# Patient Record
Sex: MALE | URBAN - METROPOLITAN AREA
[De-identification: names, ages, dates, MRNs, and addresses within clinical notes are randomized per-mention and may not be internally consistent; named-entity substitution may affect disease eponyms.]

---

## 2022-03-15 ENCOUNTER — APPOINTMENT (RX ONLY)
Dept: URBAN - METROPOLITAN AREA CLINIC 4 | Facility: CLINIC | Age: 32
Setting detail: DERMATOLOGY
End: 2022-03-15

## 2022-03-15 DIAGNOSIS — D22 MELANOCYTIC NEVI: ICD-10-CM

## 2022-03-15 DIAGNOSIS — L81.4 OTHER MELANIN HYPERPIGMENTATION: ICD-10-CM

## 2022-03-15 DIAGNOSIS — L82.1 OTHER SEBORRHEIC KERATOSIS: ICD-10-CM

## 2022-03-15 DIAGNOSIS — L57.8 OTHER SKIN CHANGES DUE TO CHRONIC EXPOSURE TO NONIONIZING RADIATION: ICD-10-CM

## 2022-03-15 DIAGNOSIS — D18.0 HEMANGIOMA: ICD-10-CM

## 2022-03-15 PROBLEM — D18.01 HEMANGIOMA OF SKIN AND SUBCUTANEOUS TISSUE: Status: ACTIVE | Noted: 2022-03-15

## 2022-03-15 PROBLEM — D22.5 MELANOCYTIC NEVI OF TRUNK: Status: ACTIVE | Noted: 2022-03-15

## 2022-03-15 PROCEDURE — ? COUNSELING

## 2022-03-15 PROCEDURE — 99203 OFFICE O/P NEW LOW 30 MIN: CPT

## 2022-03-15 ASSESSMENT — LOCATION ZONE DERM
LOCATION ZONE: TRUNK
LOCATION ZONE: LEG
LOCATION ZONE: ARM
LOCATION ZONE: FACE

## 2022-03-15 ASSESSMENT — LOCATION DETAILED DESCRIPTION DERM
LOCATION DETAILED: LEFT INFERIOR CENTRAL MALAR CHEEK
LOCATION DETAILED: LEFT ANTERIOR DISTAL THIGH
LOCATION DETAILED: RIGHT DISTAL DORSAL FOREARM
LOCATION DETAILED: RIGHT CENTRAL MALAR CHEEK
LOCATION DETAILED: RIGHT INFERIOR UPPER BACK
LOCATION DETAILED: RIGHT ANTERIOR DISTAL THIGH
LOCATION DETAILED: RIGHT MID-UPPER BACK
LOCATION DETAILED: LEFT ANTERIOR PROXIMAL UPPER ARM
LOCATION DETAILED: RIGHT ANTERIOR PROXIMAL UPPER ARM
LOCATION DETAILED: LEFT DISTAL DORSAL FOREARM
LOCATION DETAILED: LEFT MEDIAL SUPERIOR CHEST
LOCATION DETAILED: RIGHT SUPERIOR MEDIAL UPPER BACK

## 2022-03-15 ASSESSMENT — LOCATION SIMPLE DESCRIPTION DERM
LOCATION SIMPLE: RIGHT UPPER BACK
LOCATION SIMPLE: LEFT FOREARM
LOCATION SIMPLE: LEFT THIGH
LOCATION SIMPLE: RIGHT THIGH
LOCATION SIMPLE: RIGHT UPPER ARM
LOCATION SIMPLE: RIGHT CHEEK
LOCATION SIMPLE: CHEST
LOCATION SIMPLE: LEFT UPPER ARM
LOCATION SIMPLE: LEFT CHEEK
LOCATION SIMPLE: RIGHT FOREARM

## 2025-04-25 ENCOUNTER — APPOINTMENT (OUTPATIENT)
Dept: RADIOLOGY | Facility: MEDICAL CENTER | Age: 35
End: 2025-04-25
Attending: EMERGENCY MEDICINE
Payer: COMMERCIAL

## 2025-04-25 ENCOUNTER — HOSPITAL ENCOUNTER (EMERGENCY)
Facility: MEDICAL CENTER | Age: 35
End: 2025-04-25
Attending: EMERGENCY MEDICINE
Payer: COMMERCIAL

## 2025-04-25 VITALS
TEMPERATURE: 98.7 F | SYSTOLIC BLOOD PRESSURE: 110 MMHG | OXYGEN SATURATION: 96 % | BODY MASS INDEX: 24.84 KG/M2 | WEIGHT: 193.56 LBS | DIASTOLIC BLOOD PRESSURE: 70 MMHG | HEART RATE: 53 BPM | HEIGHT: 74 IN | RESPIRATION RATE: 20 BRPM

## 2025-04-25 DIAGNOSIS — T18.128A ESOPHAGEAL OBSTRUCTION DUE TO FOOD IMPACTION: ICD-10-CM
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
## 2025-04-25 DIAGNOSIS — T18.9XXA SWALLOWED FOREIGN BODY, INITIAL ENCOUNTER: ICD-10-CM

## 2025-04-25 PROCEDURE — 71046 X-RAY EXAM CHEST 2 VIEWS: CPT

## 2025-04-25 PROCEDURE — 700111 HCHG RX REV CODE 636 W/ 250 OVERRIDE (IP): Performed by: EMERGENCY MEDICINE

## 2025-04-25 PROCEDURE — 160202 HCHG ENDO MINUTES - 1ST 30 MINS LEVEL 3: Performed by: INTERNAL MEDICINE

## 2025-04-25 PROCEDURE — 96375 TX/PRO/DX INJ NEW DRUG ADDON: CPT

## 2025-04-25 PROCEDURE — A9270 NON-COVERED ITEM OR SERVICE: HCPCS | Performed by: EMERGENCY MEDICINE

## 2025-04-25 PROCEDURE — 700102 HCHG RX REV CODE 250 W/ 637 OVERRIDE(OP): Performed by: EMERGENCY MEDICINE

## 2025-04-25 PROCEDURE — 94799 UNLISTED PULMONARY SVC/PX: CPT

## 2025-04-25 PROCEDURE — 94760 N-INVAS EAR/PLS OXIMETRY 1: CPT

## 2025-04-25 PROCEDURE — 96374 THER/PROPH/DIAG INJ IV PUSH: CPT

## 2025-04-25 PROCEDURE — 99285 EMERGENCY DEPT VISIT HI MDM: CPT

## 2025-04-25 PROCEDURE — 160048 HCHG OR STATISTICAL LEVEL 1-5: Performed by: INTERNAL MEDICINE

## 2025-04-25 RX ORDER — OMEPRAZOLE 20 MG/1
20 CAPSULE, DELAYED RELEASE ORAL 2 TIMES DAILY
Qty: 60 CAPSULE | Refills: 0 | Status: SHIPPED | OUTPATIENT
Start: 2025-04-25 | End: 2025-05-25

## 2025-04-25 RX ORDER — NITROGLYCERIN 0.4 MG/1
0.4 TABLET SUBLINGUAL ONCE
Status: COMPLETED | OUTPATIENT
Start: 2025-04-25 | End: 2025-04-25

## 2025-04-25 RX ORDER — PROPOFOL 10 MG/ML
200 INJECTION, EMULSION INTRAVENOUS ONCE
Status: COMPLETED | OUTPATIENT
Start: 2025-04-25 | End: 2025-04-25

## 2025-04-25 RX ORDER — ONDANSETRON 2 MG/ML
4 INJECTION INTRAMUSCULAR; INTRAVENOUS ONCE
Status: COMPLETED | OUTPATIENT
Start: 2025-04-25 | End: 2025-04-25

## 2025-04-25 RX ADMIN — PROPOFOL 150 MG: 10 INJECTION, EMULSION INTRAVENOUS at 19:09

## 2025-04-25 RX ADMIN — ONDANSETRON 4 MG: 2 INJECTION INTRAMUSCULAR; INTRAVENOUS at 18:46

## 2025-04-25 RX ADMIN — GLUCAGON 1 MG: KIT at 17:53

## 2025-04-25 RX ADMIN — NITROGLYCERIN 0.4 MG: 0.4 TABLET SUBLINGUAL at 18:00

## 2025-04-25 NOTE — ED TRIAGE NOTES
"Patient presents to the ER with the following complaints:    Chief Complaint   Patient presents with    Foreign Body Swallowed     Patient states he has a chicken nugget stuck in his throat this is causing an increase of saliva production causing him to throw up the saliva hourly per patient. Patient has has had this happen once before which was resolved by throwing up.        BP (!) 142/84   Pulse (!) 53   Temp 37.1 °C (98.7 °F) (Temporal)   Resp 18   Ht 1.88 m (6' 2\")   Wt 87.8 kg (193 lb 9 oz)   SpO2 97%   BMI 24.85 kg/m²       "

## 2025-04-26 NOTE — CONSULTS
.                                           Gastroenterology Consult Note     Date of Consult: 4/25/25    Requesting Physician: Dr. Tam Gomez     Reason for consult: Esophageal foreign body     History of Present Illness:     The patient is a 34-year-old male otherwise healthy presented to the emergency department today for dysphagia after eating a chicken nugget earlier today.  Patient reports over the last year or so he has had difficulty swallowing and would require sipping water along with food to swallow.  Patient require outpatient workup with his PCP provider including an esophagram was unremarkable.  He has not had any formal GI workup.  Reports he has not had required a EGD to remove esophageal food bolus prior to this.     Past Medical History:  History reviewed. No pertinent past medical history.     Past Surgical History:   History reviewed. No pertinent surgical history.     Allergies  Patient has no allergy information on record.     Social History:   Social History     Socioeconomic History    Marital status: Unknown     Spouse name: Not on file    Number of children: Not on file    Years of education: Not on file    Highest education level: Not on file   Occupational History    Not on file   Tobacco Use    Smoking status: Not on file    Smokeless tobacco: Not on file   Substance and Sexual Activity    Alcohol use: Not on file    Drug use: Not on file    Sexual activity: Not on file   Other Topics Concern    Not on file   Social History Narrative    Not on file     Social Drivers of Health     Financial Resource Strain: Not on file   Food Insecurity: Not on file   Transportation Needs: Not on file   Physical Activity: Not on file   Stress: Not on file   Social Connections: Not on file   Intimate Partner Violence: Not on file   Housing Stability: Not on file        Family History:   History reviewed. No pertinent family history.    Home Medications:  Home Medications    Medication Sig Taking?  "Last Dose Authorizing Provider   omeprazole (PRILOSEC) 20 MG delayed-release capsule Take 1 Capsule by mouth 2 times a day for 30 days. Yes  Tam Gomez M.D.         Review of Systems:  General: No fevers, chills, unintentional, weight loss.  HEENT: No scleral icterus, gum bleed.  Positive for dysphagia  Cardiology: No chest pain, palpitations, orthopnea.  Respiratory: No dyspnea, cough, wheezing.  Gastrointestinal: No abdominal pain, nausea, vomiting, changes in bowel habits, rectal bleed.  Genitourinary: No hematuria, dysuria, urgency.  Neurologic: No changes in memory, confusion, gait instability.  Skin: No ecchymosis, jaundice, telangiectasia.    Physical Exam:  Vitals:    04/25/25 1720 04/25/25 1733 04/25/25 1754 04/25/25 1825   BP: 135/85 115/76  116/75   Pulse: (!) 49 (!) 49 60 (!) 52   Resp:       Temp:       TempSrc:       SpO2: 96% 97% 97% 96%   Weight:       Height:         General: No acute cardiopulmonary distress.  Head: Normocephalic.  EENT: Scleral anicterus. Mucosa moist.   Respiratory: Breath sounds present. Symmetrical rise of anterior thorax.  Cardiovascular: Normal S1, S2.  Gastrointestinal: Soft, nontender, nondistended. Normoactive bowel sounds. No guarding.  Neurologic: Alert and oriented.  Skin: Warm and dry.      LABS:No results found for: \"SODIUM\", \"POTASSIUM\", \"CHLORIDE\", \"CO2\", \"GLUCOSE\", \"BUN\", \"CREATININE\", \"BUNCREATRAT\", \"GLOMRATE\"   No results found for: \"WBC\", \"RBC\", \"HEMOGLOBIN\", \"HEMATOCRIT\", \"MCV\", \"MCH\", \"MCHC\", \"MPV\", \"NEUTSPOLYS\", \"LYMPHOCYTES\", \"MONOCYTES\", \"EOSINOPHILS\", \"BASOPHILS\", \"HYPOCHROMIA\", \"ANISOCYTOSIS\"     No results found for: \"PROTHROMBTM\", \"INR\"         IMAGING: Reviewed personally and summarized in HPI.    Active Problems:    * No active hospital problems. *  Resolved Problems:    * No resolved hospital problems. *          ASSESSMENT:     Esophageal foreign body         PLAN:     Given the patient's symptoms recommend to proceed with an EGD for foreign " body removal.  Risk and benefits discussed including anesthesia risk, bleeding, infection, bowel perforation.  Patient is agreeable to proceed with EGD.  Will plan for EGD in the ER today.  Anesthesia will be administered by the ER physician.        Thank you for the consultation. Please call with any questions or concerns.    Tl Chan D.O.  Gastroenterology  [facility]  [phone]

## 2025-04-26 NOTE — ED PROVIDER NOTES
ED Provider Note    CHIEF COMPLAINT  Chief Complaint   Patient presents with    Foreign Body Swallowed     Patient states he has a chicken nugget stuck in his throat this is causing an increase of saliva production causing him to throw up the saliva hourly per patient. Patient has has had this happen once before which was resolved by throwing up.        EXTERNAL RECORDS REVIEWED      HPI/ROS  LIMITATION TO HISTORY     OUTSIDE HISTORIAN(S):      Elton Ackerman is a 34 y.o. male who presents to the emergency department with esophageal foreign body.  Patient reports 5 hours prior he ate chicken nugget and felt to get stuck in his throat.  He reports that he saw this 1 time prior however vomited it up.  Patient reports that he has had increased aching in his esophagus recently and feels as though he always needs to drink water with food to allow it to pass in his stomach.  He reports a uncomfortable sensation in his upper chest/throat at this time.  No respiratory distress.  He has been trying to take small sips of water and soda unsuccessfully and has had to regurgitate it up several times.  No other acute symptom change or concern at this time.  Does have previous history of GERD and takes occasional Pepcid for it.    PAST MEDICAL HISTORY   GERD    SURGICAL HISTORY  patient denies any surgical history    FAMILY HISTORY  History reviewed. No pertinent family history.    SOCIAL HISTORY  Social History     Tobacco Use    Smoking status: Not on file    Smokeless tobacco: Not on file   Substance and Sexual Activity    Alcohol use: Not on file    Drug use: Not on file    Sexual activity: Not on file       CURRENT MEDICATIONS  Home Medications       Reviewed by Jay Gann R.N. (Registered Nurse) on 04/25/25 at 1650  Med List Status: Not Addressed     Medication Last Dose Status        Patient Delfino Taking any Medications                           ALLERGIES  Not on File    PHYSICAL EXAM  VITAL SIGNS: BP (!) 142/84    "Pulse (!) 53   Temp 37.1 °C (98.7 °F) (Temporal)   Resp 18   Ht 1.88 m (6' 2\")   Wt 87.8 kg (193 lb 9 oz)   SpO2 97%   BMI 24.85 kg/m²      Pulse ox interpretation: I interpret this pulse ox as normal.  Constitutional: Alert and oriented x 3, minimal distress  HEENT: Atraumatic normocephalic, pupils are equal round reactive to light extraocular movements are intact. The nares is clear, external ears are normal, mouth shows moist mucous membranes normal dentition for age  Neck: Supple, no JVD no tracheal deviation  Cardiovascular: Regular rate and rhythm no murmur rub or gallop 2+ pulses peripherally x4  Thorax & Lungs: No respiratory distress, no wheezes rales or rhonchi, No chest tenderness.   GI: Soft nontender nondistended positive bowel sounds, no peritoneal signs  Skin: Warm dry no acute rash or lesion  Musculoskeletal: Moving all extremities with full range and 5 of 5 strength no acute  deformity  Neurologic: Cranial nerves III through XII are grossly intact no sensory deficit no cerebellar dysfunction   Psychiatric: Appropriate affect for situation at this time          RADIOLOGY/PROCEDURES   I have independently interpreted the diagnostic imaging associated with this visit and am waiting the final reading from the radiologist.   My preliminary interpretation is as follows:   No acute cardiopulmonary process    Radiologist interpretation:  No orders to display       COURSE & MEDICAL DECISION MAKING      Conscious Sedation Procedure Note    Indication: procedural pain management    Consent: I have discussed with the patient and/or the patient representative the indication, alternatives, and the possible risks and/or complications of the planned procedure and the anesthesia methods. The patient and/or patient representative appear to understand and agree to proceed.    Physician Involvement: The attending physician was present and supervising this procedure.    Pre-Sedation Documentation and Exam: I have " personally completed a history, physical exam & review of systems for this patient (see notes).  Airway Assessment: dentition not prohibitive  f3  Prior History of Anesthesia Complications: none    ASA Classification: Class 1 - A normal healthy patient    Sedation/ Anesthesia Plan: intravenous sedation    Medications Used: propofol intravenously    Monitoring and Safety: The patient was placed on a cardiac monitor and vital signs, pulse oximetry and level of consciousness were continuously evaluated throughout the procedure. The patient was closely monitored until recovery from the medications was complete and the patient had returned to baseline status. Respiratory therapy was on standby at all times during the procedure.      (The following sections must be completed)  Post-Sedation Vital Signs: Vital signs were reviewed and were stable after the procedure (see flow sheet for vitals)            Intraservice Time: Greater than 10 minutes    Post-Sedation Exam: Lungs: clear           Complications: none    I provided both the sedation and procedure, a nurse was present at the bedside for the entire procedure.     ASSESSMENT, COURSE AND PLAN  Care Narrative: Very pleasant 34-year-old male with esophageal food bolus.  Glucagon and nitro unsuccessful.  Discussed with gastroenterologist Dr. Chan who is quickly at the patient's bedside.  I provided moderate conscious sedation at the bedside.  This was uneventful as was the EGD and esophageal fluid bolus was successfully removed.  Gastroenterology recommends omeprazole twice daily for 1 month follow-up with GI as an outpatient likely repeat EGD.  Patient is given instructions to return here for worsening difficulty swallowing chest pain shortness of breath any other acute symptom change or concern otherwise discharged in stable and improved condition.      ADDITIONAL PROBLEMS MANAGED      DISPOSITION AND DISCUSSIONS  I have discussed management of the patient with the  "following physicians and DARIN's:      Discussion of management with other QHP or appropriate source(s):      Escalation of care considered, and ultimately not performed:acute inpatient care management, however at this time, the patient is most appropriate for outpatient management    Barriers to care at this time, including but not limited to: .     Decision tools and prescription drugs considered including, but not limited to: .  /70   Pulse (!) 53   Temp 37.1 °C (98.7 °F) (Temporal)   Resp 20   Ht 1.88 m (6' 2\")   Wt 87.8 kg (193 lb 9 oz)   SpO2 96%   BMI 24.85 kg/m²     Tl Chan D.O.  [address] [place] [address] [phone]          [facility], Emergency Dept  [address]  [place] [address] [phone]    in 12-24 hours if symptoms persist, immediately If symptoms worsen, or if you develop any other symptoms or concerns      FINAL DIAGNOSIS  1. Swallowed foreign body, initial encounter Inactive   2. Esophageal obstruction due to food impaction Inactive   3.  Procedural sedation by ERP     Electronically signed by: Tam Gomez M.D.,      "

## 2025-04-26 NOTE — PROCEDURES
.Procedure Performed: Esophagogastroduodenoscopy (EGD) with foreign body removal     Indication for Procedure: Esophageal foreign body     Procedure Date: 4/25/2025  Performing Physician: Tl Chan D.O.    Informed Consent: Before the procedure was performed, the risks, benefits, and complications of the procedure were explained in layman's terms to the patient and understood by the patient. The risks included, but were not limited to the risks of anesthesia/sedation, bleeding, pneumothorax, perforation, adverse reaction to medication, acute respiratory failure and possible death. The patient fully understood the risks and benefits and requested the procedure be performed.       Consent Obtained: From the patient.            Anesthesia: Per bided by ED provider     Findings:   1. Esophagus: Impacted food in the distal esophagus status post partial removal with a Arciniega net.  With debulking of the food bolus the remainder of the food passed spontaneously into the stomach.  Considering throughout the esophagus suggestive of EOE however given food content in the stomach and risk of aspiration biopsy was not taken to minimize sedation time  2. Stomach: Food content however no gross lesion  3. Duodenum: Not visualized    Procedure Summary: Timeout was performed. The patient was placed in the left lateral decubitus position. An Olympus adult gastroscope was gently passed into the oropharynx and esophagus and advanced into the gastric body.  Findings as above.  Continuous monitoring of pulse oximetry, heart rhythm and blood pressure were done before, during and after the procedure. The patient left the endoscopy lab in stable condition.    Estimated Blood Loss, if any: None     Unusual Events or Complications: None     Specimen(s), if any: None     Disposition:     - Resume diet as able.  Recommend chew food thoroughly and remain on soft diet for now  -Recommend PPI daily for empiric treatment of EOE.  - Recommend repeat  EGD in the outpatient setting with [facility] at the next available appointment for full evaluation and workup of possible EOE    .Tl Chan D.O.  Gastroenterology  [facility]  [phone]

## 2025-04-26 NOTE — ED NOTES
Patient is AAOX4. Breathing is even and unlabored. Patient ambulates self unassisted. Gait is steady and even.

## 2025-04-26 NOTE — ED NOTES
.Patient is AAOX4. Breathing is even and unlabored. Patient was read discharge instructions. Patient has no other questions or concerns at this time. Patient tolerated iv removal. Skin is clean and dry. Patient ambulates self unassisted. Patients gait is steady and even to pov.

## 2025-05-05 NOTE — Clinical Note
REFERRAL APPROVAL NOTICE         Sent on May 5, 2025                   Elton Ackerman  [address] [place] [address]                   Dear Mr. Ackerman,    After a careful review of the medical information and benefit coverage, [facility] has processed your referral. See below for additional details.    If applicable, you must be actively enrolled with your insurance for coverage of the authorized service. If you have any questions regarding your coverage, please contact your insurance directly.    REFERRAL INFORMATION   Referral #:  [id number]  Referred-To Provider    Referred-By Provider:  Gastroenterology    Tam Gomez M.D.   [facility]      [address]  [address]  [place] [address]  [phone] [address] [place] [id number] [phone]    Referral Start Date:  04/25/2025  Referral End Date:   04/25/2026             SCHEDULING  If you do not already have an appointment, please call [phone] to make an appointment.     MORE INFORMATION  If you do not already have a [email or web address] account, sign up at: [email or web address]  You can access your medical information, make appointments, see lab results, billing information, and more.  If you have questions regarding this referral, please contact  the [facility] Referrals department at:             [phone]. Monday - Friday 8:00AM - 5:00PM.     Sincerely,    [facility]

## (undated) DEVICE — BITE BLOCK ADULT 60FR (100EA/CA)

## (undated) DEVICE — KIT CUSTOM PROCEDURE SINGLE FOR ENDO (15/CA)

## (undated) DEVICE — RESCUE RETRIEVAL NET (5EA/BX)